# Patient Record
Sex: MALE | Race: WHITE | NOT HISPANIC OR LATINO | URBAN - METROPOLITAN AREA
[De-identification: names, ages, dates, MRNs, and addresses within clinical notes are randomized per-mention and may not be internally consistent; named-entity substitution may affect disease eponyms.]

---

## 2020-01-22 ENCOUNTER — INPATIENT (INPATIENT)
Facility: HOSPITAL | Age: 60
LOS: 2 days | Discharge: HOME | DRG: 247 | End: 2020-01-25
Attending: INTERNAL MEDICINE | Admitting: INTERNAL MEDICINE
Payer: MEDICAID

## 2020-01-22 VITALS
HEIGHT: 69 IN | SYSTOLIC BLOOD PRESSURE: 147 MMHG | WEIGHT: 209 LBS | DIASTOLIC BLOOD PRESSURE: 84 MMHG | RESPIRATION RATE: 18 BRPM | OXYGEN SATURATION: 97 % | HEART RATE: 60 BPM | TEMPERATURE: 98 F

## 2020-01-22 LAB
ALBUMIN SERPL ELPH-MCNC: 4.5 G/DL — SIGNIFICANT CHANGE UP (ref 3.3–5)
ALP SERPL-CCNC: 50 U/L — SIGNIFICANT CHANGE UP (ref 40–120)
ALT FLD-CCNC: 30 U/L — SIGNIFICANT CHANGE UP (ref 10–45)
ANION GAP SERPL CALC-SCNC: 13 MMOL/L — SIGNIFICANT CHANGE UP (ref 5–17)
APTT BLD: 30.3 SEC — SIGNIFICANT CHANGE UP (ref 27.5–36.3)
AST SERPL-CCNC: 26 U/L — SIGNIFICANT CHANGE UP (ref 10–40)
BILIRUB SERPL-MCNC: 0.5 MG/DL — SIGNIFICANT CHANGE UP (ref 0.2–1.2)
BUN SERPL-MCNC: 16 MG/DL — SIGNIFICANT CHANGE UP (ref 7–23)
CALCIUM SERPL-MCNC: 9.7 MG/DL — SIGNIFICANT CHANGE UP (ref 8.4–10.5)
CHLORIDE SERPL-SCNC: 103 MMOL/L — SIGNIFICANT CHANGE UP (ref 96–108)
CO2 SERPL-SCNC: 23 MMOL/L — SIGNIFICANT CHANGE UP (ref 22–31)
CREAT SERPL-MCNC: 0.76 MG/DL — SIGNIFICANT CHANGE UP (ref 0.5–1.3)
GLUCOSE SERPL-MCNC: 102 MG/DL — HIGH (ref 70–99)
HCT VFR BLD CALC: 40.8 % — SIGNIFICANT CHANGE UP (ref 39–50)
HGB BLD-MCNC: 13.8 G/DL — SIGNIFICANT CHANGE UP (ref 13–17)
INR BLD: 1.12 RATIO — SIGNIFICANT CHANGE UP (ref 0.88–1.16)
MAGNESIUM SERPL-MCNC: 2.1 MG/DL — SIGNIFICANT CHANGE UP (ref 1.6–2.6)
MCHC RBC-ENTMCNC: 30.4 PG — SIGNIFICANT CHANGE UP (ref 27–34)
MCHC RBC-ENTMCNC: 33.8 GM/DL — SIGNIFICANT CHANGE UP (ref 32–36)
MCV RBC AUTO: 89.9 FL — SIGNIFICANT CHANGE UP (ref 80–100)
NRBC # BLD: 0 /100 WBCS — SIGNIFICANT CHANGE UP (ref 0–0)
PLATELET # BLD AUTO: 204 K/UL — SIGNIFICANT CHANGE UP (ref 150–400)
POTASSIUM SERPL-MCNC: 4 MMOL/L — SIGNIFICANT CHANGE UP (ref 3.5–5.3)
POTASSIUM SERPL-SCNC: 4 MMOL/L — SIGNIFICANT CHANGE UP (ref 3.5–5.3)
PROT SERPL-MCNC: 7.2 G/DL — SIGNIFICANT CHANGE UP (ref 6–8.3)
PROTHROM AB SERPL-ACNC: 12.9 SEC — SIGNIFICANT CHANGE UP (ref 10–12.9)
RBC # BLD: 4.54 M/UL — SIGNIFICANT CHANGE UP (ref 4.2–5.8)
RBC # FLD: 12.8 % — SIGNIFICANT CHANGE UP (ref 10.3–14.5)
SODIUM SERPL-SCNC: 139 MMOL/L — SIGNIFICANT CHANGE UP (ref 135–145)
TROPONIN T, HIGH SENSITIVITY RESULT: 15 NG/L — SIGNIFICANT CHANGE UP (ref 0–51)
TROPONIN T, HIGH SENSITIVITY RESULT: 15 NG/L — SIGNIFICANT CHANGE UP (ref 0–51)
WBC # BLD: 8.42 K/UL — SIGNIFICANT CHANGE UP (ref 3.8–10.5)
WBC # FLD AUTO: 8.42 K/UL — SIGNIFICANT CHANGE UP (ref 3.8–10.5)

## 2020-01-22 PROCEDURE — 71046 X-RAY EXAM CHEST 2 VIEWS: CPT | Mod: 26

## 2020-01-22 PROCEDURE — 99220: CPT

## 2020-01-22 PROCEDURE — 93010 ELECTROCARDIOGRAM REPORT: CPT

## 2020-01-22 RX ORDER — ASPIRIN/CALCIUM CARB/MAGNESIUM 324 MG
162 TABLET ORAL ONCE
Refills: 0 | Status: DISCONTINUED | OUTPATIENT
Start: 2020-01-22 | End: 2020-01-22

## 2020-01-22 RX ORDER — LISINOPRIL 2.5 MG/1
20 TABLET ORAL DAILY
Refills: 0 | Status: DISCONTINUED | OUTPATIENT
Start: 2020-01-22 | End: 2020-01-25

## 2020-01-22 RX ORDER — ASPIRIN/CALCIUM CARB/MAGNESIUM 324 MG
81 TABLET ORAL DAILY
Refills: 0 | Status: DISCONTINUED | OUTPATIENT
Start: 2020-01-22 | End: 2020-01-25

## 2020-01-22 RX ORDER — ASPIRIN/CALCIUM CARB/MAGNESIUM 324 MG
243 TABLET ORAL ONCE
Refills: 0 | Status: COMPLETED | OUTPATIENT
Start: 2020-01-22 | End: 2020-01-22

## 2020-01-22 RX ADMIN — Medication 243 MILLIGRAM(S): at 19:42

## 2020-01-22 NOTE — ED ADULT NURSE NOTE - CHPI ED NUR SYMPTOMS NEG
no chills/no shortness of breath/no vomiting/no nausea/no dizziness/no syncope/no fever/no diaphoresis/no congestion

## 2020-01-22 NOTE — ED PROVIDER NOTE - PHYSICAL EXAMINATION
Gen: AAOx3, NAD   Head: NCAT  ENT: Airway patent, moist mucous membranes, nasal passageways clear, no JVD   Cardiac: Normal rate, normal rhythm, no murmurs/rubs/gallops appreciated  Respiratory: Lungs CTA B/L  Gastrointestinal:  Abdomen soft, nontender, nondistended, no rebound, no guarding   MSK: No gross abnormalities, FROM of all four extremities, no edema  HEME: Extremities warm, pulses intact and symmetrical in all four extremities  Skin: No rashes, no lesions  Neuro: No gross neurologic deficits

## 2020-01-22 NOTE — ED ADULT NURSE REASSESSMENT NOTE - NS ED NURSE REASSESS COMMENT FT1
Pt received from MARLON Moon. Pt oriented to CDU & plan of care was discussed. Pt A&O x 3. Pt in CDU for CTC in am and cardiac monitoring. Pt denies any chest pain, SOB, dizziness or palpitations as of now. Pt on a cardiac monitor in NSR, HR in 60's. Pt resting in bed. Safety & comfort measures maintained. Call bell in reach. Will continue to monitor.

## 2020-01-22 NOTE — ED ADULT NURSE NOTE - OBJECTIVE STATEMENT
Pt is a 59 Y A&O x3 M with hx of HTN on lisinopril presenting to ED with c/o intermittent "uncomfortable" L sided chest pain radiating to L upper back x1 week. Pt denies SOB, trouble breathing, dizziness, N/V, fevers. Pt arrives to ED breathing unlabored on RA, speaking in complete sentences. Skin is warm and dry. No diaphoresis noted. No edema to LE b/l noted. Cardiac monitor in place. 20 G IV established to L AC. Safety and comfort maintained. Awaiting MD evaluation.

## 2020-01-22 NOTE — ED PROVIDER NOTE - CLINICAL SUMMARY MEDICAL DECISION MAKING FREE TEXT BOX
59M hx HTN without hx of cardiac eval, p/w recurrent chest pressure like pain, ekg sinus without ischemic changes, will cdu pt if labs unremarkable to eval for possible acs

## 2020-01-22 NOTE — ED CDU PROVIDER INITIAL DAY NOTE - OBJECTIVE STATEMENT
58y/o M with PMH of HTN  presenting w/ CP x 1 -1.5 weeks. CP localized to left chest, mild, pressure like sensation, radiates to the left posterior shoulder, intermittent, lasting 30min when it occurs, self resolves, occurs with exertion only. denies associated nausea, diaphoresis, sob, lightheadedness. No positional association. Never had a cardiology evaluation, famhx + father had cardiac issues in the 70s. No recent travel, LE edema or recent illness. No rash. denies fever, flu like symptoms, V/D, abd pain, problems walking, problems going to the bathroom.   In ED, labs unremarkable, CXR negative, delta trops negative. pt sent to CDU for further cardiac work up and monitoring.     PMD Dr. Guillaume in [place], not aff.   no cardio

## 2020-01-22 NOTE — ED PROVIDER NOTE - NS ED ROS FT
Gen: No fever, normal appetite  Eyes: No eye irritation or discharge  ENT: No ear pain, congestion, sore throat  Resp: No cough or trouble breathing  Cardiovascular: + chest pain   Gastroenteric: No nausea/vomiting, diarrhea, constipation  :  No change in urine output; no dysuria  MS: No joint or muscle pain  Skin: No rashes  Neuro: No headache; no abnormal movements  Remainder negative, except as per the HPI

## 2020-01-22 NOTE — ED PROVIDER NOTE - ATTENDING CONTRIBUTION TO CARE
60 yo male p/w substernal intermittent moderate CP x 1-1.5 weeks.  currently pain free on my assessment.  EKG without STEMI.  hx of HTN, no prior cardiac w/u per patient.  will give ASA, check labs, CXR, CDU for serial monitoring and stress vs coronary CT in AM.

## 2020-01-22 NOTE — ED CDU PROVIDER INITIAL DAY NOTE - PSH
No significant past surgical history
Normal rate, regular rhythm.  Heart sounds S1, S2.  No murmurs, rubs or gallops.

## 2020-01-22 NOTE — ED PROVIDER NOTE - OBJECTIVE STATEMENT
59M hx HTN on lisinopril 20mg, nonsmoker, presenting w/ localized mild left chest pressure like sensation with pressure like sensation to the left upper back, intermittent, lasting 30min when it occurs, self resolves, non exertional without associated nausea, diaphoresis, sob. No positional association. Never had a cardiology evaluation, famhx + father had cardiac issues in the 70s. No recent travel, LE  edema or recent illness. No rash. symptoms ongoing x 1-1.5 wks

## 2020-01-22 NOTE — ED PROVIDER NOTE - INTERPRETATION
EKG reviewed for rate, rhythm, axis, intervals and segments, including QRS morphology, P wave appearance T wave appearance, SC interval, and QT interval.  I find the EKG to be unremarkable in all of these regards except as follows: 1st deg AVB

## 2020-01-23 DIAGNOSIS — R07.9 CHEST PAIN, UNSPECIFIED: ICD-10-CM

## 2020-01-23 DIAGNOSIS — Z29.9 ENCOUNTER FOR PROPHYLACTIC MEASURES, UNSPECIFIED: ICD-10-CM

## 2020-01-23 DIAGNOSIS — I10 ESSENTIAL (PRIMARY) HYPERTENSION: ICD-10-CM

## 2020-01-23 PROCEDURE — 75574 CT ANGIO HRT W/3D IMAGE: CPT | Mod: 26

## 2020-01-23 PROCEDURE — 99222 1ST HOSP IP/OBS MODERATE 55: CPT | Mod: AI

## 2020-01-23 PROCEDURE — 99217: CPT

## 2020-01-23 RX ORDER — INFLUENZA VIRUS VACCINE 15; 15; 15; 15 UG/.5ML; UG/.5ML; UG/.5ML; UG/.5ML
0.5 SUSPENSION INTRAMUSCULAR ONCE
Refills: 0 | Status: DISCONTINUED | OUTPATIENT
Start: 2020-01-23 | End: 2020-01-25

## 2020-01-23 RX ORDER — HEPARIN SODIUM 5000 [USP'U]/ML
5000 INJECTION INTRAVENOUS; SUBCUTANEOUS EVERY 8 HOURS
Refills: 0 | Status: DISCONTINUED | OUTPATIENT
Start: 2020-01-23 | End: 2020-01-25

## 2020-01-23 RX ORDER — ATORVASTATIN CALCIUM 80 MG/1
80 TABLET, FILM COATED ORAL AT BEDTIME
Refills: 0 | Status: DISCONTINUED | OUTPATIENT
Start: 2020-01-23 | End: 2020-01-23

## 2020-01-23 RX ORDER — METOPROLOL TARTRATE 50 MG
25 TABLET ORAL DAILY
Refills: 0 | Status: DISCONTINUED | OUTPATIENT
Start: 2020-01-23 | End: 2020-01-25

## 2020-01-23 RX ORDER — CLOPIDOGREL BISULFATE 75 MG/1
75 TABLET, FILM COATED ORAL DAILY
Refills: 0 | Status: DISCONTINUED | OUTPATIENT
Start: 2020-01-24 | End: 2020-01-24

## 2020-01-23 RX ORDER — CLOPIDOGREL BISULFATE 75 MG/1
600 TABLET, FILM COATED ORAL ONCE
Refills: 0 | Status: COMPLETED | OUTPATIENT
Start: 2020-01-23 | End: 2020-01-23

## 2020-01-23 RX ORDER — ATORVASTATIN CALCIUM 80 MG/1
80 TABLET, FILM COATED ORAL AT BEDTIME
Refills: 0 | Status: DISCONTINUED | OUTPATIENT
Start: 2020-01-23 | End: 2020-01-25

## 2020-01-23 RX ADMIN — Medication 81 MILLIGRAM(S): at 14:12

## 2020-01-23 RX ADMIN — ATORVASTATIN CALCIUM 80 MILLIGRAM(S): 80 TABLET, FILM COATED ORAL at 21:10

## 2020-01-23 RX ADMIN — HEPARIN SODIUM 5000 UNIT(S): 5000 INJECTION INTRAVENOUS; SUBCUTANEOUS at 21:10

## 2020-01-23 RX ADMIN — CLOPIDOGREL BISULFATE 600 MILLIGRAM(S): 75 TABLET, FILM COATED ORAL at 14:11

## 2020-01-23 RX ADMIN — Medication 25 MILLIGRAM(S): at 14:11

## 2020-01-23 RX ADMIN — LISINOPRIL 20 MILLIGRAM(S): 2.5 TABLET ORAL at 14:12

## 2020-01-23 NOTE — H&P ADULT - NSHPLABSRESULTS_GEN_ALL_CORE
13.8   8.42  )-----------( 204      ( 22 Jan 2020 19:35 )             40.8     01-22    139  |  103  |  16  ----------------------------<  102<H>  4.0   |  23  |  0.76    Ca    9.7      22 Jan 2020 19:35  Mg     2.1     01-22    TPro  7.2  /  Alb  4.5  /  TBili  0.5  /  DBili  x   /  AST  26  /  ALT  30  /  AlkPhos  50  01-22 13.8   8.42  )-----------( 204      ( 22 Jan 2020 19:35 )             40.8     01-22    139  |  103  |  16  ----------------------------<  102<H>  4.0   |  23  |  0.76    Ca    9.7      22 Jan 2020 19:35  Mg     2.1     01-22    TPro  7.2  /  Alb  4.5  /  TBili  0.5  /  DBili  x   /  AST  26  /  ALT  30  /  AlkPhos  50  01-22    CXR: personally reviewed by me: clear lungs    EKG:     CT Coronary: 13.8   8.42  )-----------( 204      ( 22 Jan 2020 19:35 )             40.8     01-22    139  |  103  |  16  ----------------------------<  102<H>  4.0   |  23  |  0.76    Ca    9.7      22 Jan 2020 19:35  Mg     2.1     01-22    TPro  7.2  /  Alb  4.5  /  TBili  0.5  /  DBili  x   /  AST  26  /  ALT  30  /  AlkPhos  50  01-22    CXR: personally reviewed by me: clear lungs    EKG: No acute St or T wave changes    CT Coronary:    IMPRESSION:    The calculated Agatston calcium score is 821.    Severe (approximately 70%) stenosis of the proximal aspect of the first obtuse marginal branch (OM1).     Mild-moderate (approximately 50%) stenosis of the proximal RCA secondary to mixed plaques    Minimal (less than 25%) to mild (25-50%) stenosis of the left anterior descending, mid to distal RCA, left  circumflex arteries.    Far distal part of the LAD is not well evaluated secondary to very low caliber of the vessel.

## 2020-01-23 NOTE — CONSULT NOTE ADULT - SUBJECTIVE AND OBJECTIVE BOX
Edenilson Vee MD  Interventional Cardiology / Endovascular Specialist  [place] Office : [phone] [place] [address] [place] [address]  Tel:   [place] Office : [address] [facility] [place] [address]  Tel: [phone]  Cell : [phone]    HISTORY OF PRESENTING ILLNESS:    58y/o M with PMH of HTN  presenting w/ CP x 1 -1.5 weeks. CP localized to left chest, mild, pressure like sensation, radiates to the left posterior shoulder, intermittent, lasting 30min when it occurs, self resolves, occurs with exertion only. denies associated nausea, diaphoresis, sob, lightheadedness. No positional association. Never had a cardiology evaluation, famhx + father had cardiac issues in the 70s. No recent travel, LE edema or recent illness. No rash. denies fever, flu like symptoms, V/D, abd pain, problems walking, problems going to the bathroom.   In ED, labs unremarkable, CXR negative, delta trops negative. Pt had CCTA which showed sever prox LCX ds and cards consulted   	  MEDICATIONS:  aspirin enteric coated 81 milliGRAM(s) Oral daily  heparin  Injectable 5000 Unit(s) SubCutaneous every 8 hours  lisinopril 20 milliGRAM(s) Oral daily  metoprolol succinate ER 25 milliGRAM(s) Oral daily                  PAST MEDICAL/SURGICAL HISTORY  PAST MEDICAL & SURGICAL HISTORY:  HTN (hypertension)  No significant past surgical history      SOCIAL HISTORY: Substance Use (street drugs): ( x ) never used  (  ) other:    FAMILY HISTORY:  FH: myocardial infarction      REVIEW OF SYSTEMS:  CONSTITUTIONAL: No fever, weight loss, or fatigue  EYES: No eye pain, visual disturbances, or discharge  ENMT:  No difficulty hearing, tinnitus, vertigo; No sinus or throat pain  BREASTS: No pain, masses, or nipple discharge  GASTROINTESTINAL: No abdominal or epigastric pain. No nausea, vomiting, or hematemesis; No diarrhea or constipation. No melena or hematochezia.  GENITOURINARY: No dysuria, frequency, hematuria, or incontinence  NEUROLOGICAL: No headaches, memory loss, loss of strength, numbness, or tremors  ENDOCRINE: No heat or cold intolerance; No hair loss  MUSCULOSKELETAL: No joint pain or swelling; No muscle, back, or extremity pain  PSYCHIATRIC: No depression, anxiety, mood swings, or difficulty sleeping  HEME/LYMPH: No easy bruising, or bleeding gums  All others negative    PHYSICAL EXAM:  T(C): 36.4 (01-23-20 @ 16:29), Max: 36.9 (01-23-20 @ 16:00)  HR: 56 (01-23-20 @ 16:29) (55 - 99)  BP: 131/81 (01-23-20 @ 16:29) (115/72 - 147/84)  RR: 16 (01-23-20 @ 16:29) (16 - 18)  SpO2: 97% (01-23-20 @ 16:29) (96% - 99%)  Wt(kg): --  I&O's Summary    Height (cm): 175.26 (01-22 @ 18:25)  Weight (kg): 94.8 (01-22 @ 18:25)  BMI (kg/m2): 30.9 (01-22 @ 18:25)  BSA (m2): 2.1 (01-22 @ 18:25)    GENERAL: NAD, well-groomed, well-developed  EYES: EOMI, PERRLA, conjunctiva and sclera clear  ENMT: No tonsillar erythema, exudates, or enlargement; Moist mucous membranes  Cardiovascular: Normal S1 S2, No JVD, No murmurs, No edema  Respiratory: Lungs clear to auscultation	  Gastrointestinal:  Soft, Non-tender, + BS	  Extremities: Normal range of motion, No clubbing, cyanosis or edema  LYMPH: No lymphadenopathy noted                                      13.8   8.42  )-----------( 204      ( 22 Jan 2020 19:35 )             40.8     01-22    139  |  103  |  16  ----------------------------<  102<H>  4.0   |  23  |  0.76    Ca    9.7      22 Jan 2020 19:35  Mg     2.1     01-22    TPro  7.2  /  Alb  4.5  /  TBili  0.5  /  DBili  x   /  AST  26  /  ALT  30  /  AlkPhos  50  01-22    proBNP:   Lipid Profile:   HgA1c: Hemoglobin A1C, Whole Blood: 5.8 % (01-22 @ 19:35)    TSH:     Consultant(s) Notes Reviewed:  [x ] YES  [ ] NO    Care Discussed with Consultants/Other Providers [ x] YES  [ ] NO    Imaging Personally Reviewed independently:  [x] YES  [ ] NO    All labs, radiologic studies, vitals, orders and medications list reviewed. Patient is seen and examined at bedside. Case discussed with medical team.

## 2020-01-23 NOTE — H&P ADULT - ASSESSMENT
58 y/o m w/ PMH of HTN p/w chest pain found to have positive CT coronary test result, planned for cardiac cath tomorrow

## 2020-01-23 NOTE — ED CDU PROVIDER SUBSEQUENT DAY NOTE - HISTORY
No interval changes since initial CDU provider note. Pt feels well without complaint. No active CP or L shoulder pain. NAD, VSS. sinus evin on tele. delta trops negative. pt to get CTC in AM. will continue monitoring. Jodi Griffith

## 2020-01-23 NOTE — H&P ADULT - NSHPPHYSICALEXAM_GEN_ALL_CORE
VITAL SIGNS:    Vital Signs Last 24 Hrs  T(C): 36.7 (23 Jan 2020 12:08), Max: 36.7 (22 Jan 2020 23:47)  T(F): 98.1 (23 Jan 2020 12:08), Max: 98.1 (23 Jan 2020 12:08)  HR: 58 (23 Jan 2020 12:08) (56 - 99)  BP: 133/87 (23 Jan 2020 12:08) (115/72 - 147/84)  BP(mean): --  RR: 16 (23 Jan 2020 12:08) (16 - 18)  SpO2: 96% (23 Jan 2020 12:08) (96% - 99%)    PHYSICAL EXAM:     GENERAL: no acute distress  HEENT: NC/AT, EOMI, neck supple, MMM  RESPIRATORY: LCTAB/L, no rhonchi, rales, or wheezing  CARDIOVASCULAR: RRR, no murmurs, gallops, rubs  ABDOMINAL: soft, non-tender, non-distended, positive bowel sounds   EXTREMITIES: no clubbing, cyanosis, or edema  NEUROLOGICAL: alert and oriented x 3, non-focal  SKIN: no rashes or lesions   MUSCULOSKELETAL: no gross joint deformity

## 2020-01-23 NOTE — ED CDU PROVIDER DISPOSITION NOTE - ATTENDING CONTRIBUTION TO CARE
Patient seen and eval'd at bedside.  Stress results reviewed.  Are of ischemia identified; plan to admit pt to tele after d/w cardiology.  Inpatient cath planned.  Patient aware of findings and plan, states cp free currently, no events on tele.  Will c/t monitor in cdu.  --BMM

## 2020-01-23 NOTE — ED CDU PROVIDER DISPOSITION NOTE - CLINICAL COURSE
60y/o M with PMH of HTN  presenting w/ CP x 1 -1.5 weeks. CP localized to left chest, mild, pressure like sensation, radiates to the left posterior shoulder, intermittent, lasting 30min when it occurs, self resolves, occurs with exertion only. denies associated nausea, diaphoresis, sob, lightheadedness. No positional association. Never had a cardiology evaluation, famhx + father had cardiac issues in the 70s. No recent travel, LE edema or recent illness. No rash. denies fever, flu like symptoms, V/D, abd pain, problems walking, problems going to the bathroom.   In ED, labs unremarkable, CXR negative, delta trops negative. pt sent to CDU for further cardiac work up and monitoring.  In CDU, _______________ 60y/o M with PMH of HTN  presenting w/ CP x 1 -1.5 weeks. CP localized to left chest, mild, pressure like sensation, radiates to the left posterior shoulder, intermittent, lasting 30min when it occurs, self resolves, occurs with exertion only. denies associated nausea, diaphoresis, sob, lightheadedness. No positional association. Never had a cardiology evaluation, famhx + father had cardiac issues in the 70s. No recent travel, LE edema or recent illness. No rash. denies fever, flu like symptoms, V/D, abd pain, problems walking, problems going to the bathroom.   In ED, labs unremarkable, CXR negative, delta trops negative. pt sent to CDU for further cardiac work up and monitoring.  In CDU, no events on tele, CTC revealed severe stenosis at OM branch. pt seen by ramona and admitted for cath.

## 2020-01-23 NOTE — ED CDU PROVIDER DISPOSITION NOTE - NS_EDPROVIDERDISPOUSERTYPE_ED_A_ED
----- Message from Sabrina Fritz M.D. sent at 1/22/2018  7:40 AM PST -----  Please, notify the patient that labs are reviewed, and will be discussed at OV;  - advise to continue current meds,   thanks, Dr Wright      
Phone Number Called: [phone] (home)     Message: Patient has upcoming appt.        Left Message for patient to call back: yes        
Attending Attestation (For Attendings USE Only)...

## 2020-01-23 NOTE — ED CDU PROVIDER SUBSEQUENT DAY NOTE - PROGRESS NOTE DETAILS
CDU NOTE SULTANA GARCIA: Pt resting comfortably, feeling well without complaint. NAD, VSS. No events on telemetry. will continue monitoring. CDU NOTE SULTANA Clayton: pt resting comfortably, feels well without complaint. NAD recent VSS. no events on tele. CDU NOTE SULTANA Clayton: CTC- severe disease to OM branch. spoke to Dr. Vee, Cards, will come see patient. CDU NOTE SULTANA Clayton: as per Dr. Vee, will admit pt to Dr. Abad, pt will be set up for Cath. CDU NOTE SULTANA Clayton: as per Dr. Vee- Plavix 600mg po x 1 now, then 75mg daily, NPO after midnight for cath tomorrow, start Toprol XL 25mg daily.

## 2020-01-23 NOTE — ED CDU PROVIDER DISPOSITION NOTE - NSFOLLOWUPINSTRUCTIONS_ED_ALL_ED_FT
1. Recommend Aspirin 81mg over the counter daily until further evaluation.  Take all of your other medications as previously prescribed.   2. Follow up with your PMD and cardiologist within 48-72 hours. Show copies of your reports given to you.   3. Worsening or continued chest pain, shortness of breath, weakness, return to ED.

## 2020-01-23 NOTE — H&P ADULT - PROBLEM SELECTOR PLAN 1
Pt has had 1-2 wks of chest pain, and found to have a positive CT coronary study, now planned for cardiac cath tomorrow; NPO after MN as per Dr. Monreal  - pt started on asa, plavix loaded, and toprol added  - LDL is < 100, Hba1c at 5.8  - will cont to f/u cards recs

## 2020-01-23 NOTE — H&P ADULT - NSHPREVIEWOFSYSTEMS_GEN_ALL_CORE
CARDIOVASCULAR: + Chest pain    CONSTITUTIONAL: No weakness, fevers or chills  EYES/ENT: No visual changes, no throat pain   RESPIRATORY: No cough, wheezing, hemoptysis; No shortness of breath  GASTROINTESTINAL: No abdominal, nausea, vomiting, or hematemesis; No diarrhea or constipation. No melena or hematochezia.  GENITOURINARY: No dysuria, frequency or hematuria  NEUROLOGICAL: No dizziness, numbness, or weakness  SKIN: No itching, burning, rashes, or lesions   ALLERGY: No seasonal allergies, no rhinorrhea  MUSCULOSKELETAL: No arthralgias, no myalgias

## 2020-01-23 NOTE — H&P ADULT - HISTORY OF PRESENT ILLNESS
60 y/o m w/ PMH of HTN p/w 1-2 weeks of intermittent chest pains, located in mid left chest, lasting for up to 30 minutes, pressure-like in quality, not associated with movement or food, resolve on their own.  He did not have any sob, diaphoresis, or nausea.  Pt was evaluated by his cardiologist, who ordered a CT Coronary study which revealed multiple stenoses in coronary arteries.  Pt was admitted for a cardiac catheterization.    In the ED, he was given aspirin and loaded with plavix.

## 2020-01-24 LAB
ANION GAP SERPL CALC-SCNC: 11 MMOL/L — SIGNIFICANT CHANGE UP (ref 5–17)
BASOPHILS # BLD AUTO: 0.05 K/UL — SIGNIFICANT CHANGE UP (ref 0–0.2)
BASOPHILS NFR BLD AUTO: 0.6 % — SIGNIFICANT CHANGE UP (ref 0–2)
BUN SERPL-MCNC: 20 MG/DL — SIGNIFICANT CHANGE UP (ref 7–23)
CALCIUM SERPL-MCNC: 9.8 MG/DL — SIGNIFICANT CHANGE UP (ref 8.4–10.5)
CHLORIDE SERPL-SCNC: 100 MMOL/L — SIGNIFICANT CHANGE UP (ref 96–108)
CO2 SERPL-SCNC: 23 MMOL/L — SIGNIFICANT CHANGE UP (ref 22–31)
CREAT SERPL-MCNC: 0.86 MG/DL — SIGNIFICANT CHANGE UP (ref 0.5–1.3)
EOSINOPHIL # BLD AUTO: 0.33 K/UL — SIGNIFICANT CHANGE UP (ref 0–0.5)
EOSINOPHIL NFR BLD AUTO: 3.8 % — SIGNIFICANT CHANGE UP (ref 0–6)
GLUCOSE SERPL-MCNC: 106 MG/DL — HIGH (ref 70–99)
HCT VFR BLD CALC: 45 % — SIGNIFICANT CHANGE UP (ref 39–50)
HCV AB S/CO SERPL IA: 0.21 S/CO — SIGNIFICANT CHANGE UP (ref 0–0.99)
HCV AB SERPL-IMP: SIGNIFICANT CHANGE UP
HGB BLD-MCNC: 15.1 G/DL — SIGNIFICANT CHANGE UP (ref 13–17)
IMM GRANULOCYTES NFR BLD AUTO: 0.3 % — SIGNIFICANT CHANGE UP (ref 0–1.5)
LYMPHOCYTES # BLD AUTO: 2.61 K/UL — SIGNIFICANT CHANGE UP (ref 1–3.3)
LYMPHOCYTES # BLD AUTO: 30.1 % — SIGNIFICANT CHANGE UP (ref 13–44)
MAGNESIUM SERPL-MCNC: 2.4 MG/DL — SIGNIFICANT CHANGE UP (ref 1.6–2.6)
MCHC RBC-ENTMCNC: 30.1 PG — SIGNIFICANT CHANGE UP (ref 27–34)
MCHC RBC-ENTMCNC: 33.6 GM/DL — SIGNIFICANT CHANGE UP (ref 32–36)
MCV RBC AUTO: 89.6 FL — SIGNIFICANT CHANGE UP (ref 80–100)
MONOCYTES # BLD AUTO: 0.85 K/UL — SIGNIFICANT CHANGE UP (ref 0–0.9)
MONOCYTES NFR BLD AUTO: 9.8 % — SIGNIFICANT CHANGE UP (ref 2–14)
NEUTROPHILS # BLD AUTO: 4.8 K/UL — SIGNIFICANT CHANGE UP (ref 1.8–7.4)
NEUTROPHILS NFR BLD AUTO: 55.4 % — SIGNIFICANT CHANGE UP (ref 43–77)
PHOSPHATE SERPL-MCNC: 4 MG/DL — SIGNIFICANT CHANGE UP (ref 2.5–4.5)
PLATELET # BLD AUTO: 208 K/UL — SIGNIFICANT CHANGE UP (ref 150–400)
POTASSIUM SERPL-MCNC: 4.1 MMOL/L — SIGNIFICANT CHANGE UP (ref 3.5–5.3)
POTASSIUM SERPL-SCNC: 4.1 MMOL/L — SIGNIFICANT CHANGE UP (ref 3.5–5.3)
RBC # BLD: 5.02 M/UL — SIGNIFICANT CHANGE UP (ref 4.2–5.8)
RBC # FLD: 12.7 % — SIGNIFICANT CHANGE UP (ref 10.3–14.5)
SODIUM SERPL-SCNC: 134 MMOL/L — LOW (ref 135–145)
WBC # BLD: 8.67 K/UL — SIGNIFICANT CHANGE UP (ref 3.8–10.5)
WBC # FLD AUTO: 8.67 K/UL — SIGNIFICANT CHANGE UP (ref 3.8–10.5)

## 2020-01-24 PROCEDURE — 93010 ELECTROCARDIOGRAM REPORT: CPT

## 2020-01-24 RX ORDER — ASPIRIN/CALCIUM CARB/MAGNESIUM 324 MG
1 TABLET ORAL
Qty: 30 | Refills: 0
Start: 2020-01-24 | End: 2020-02-23

## 2020-01-24 RX ORDER — LISINOPRIL 2.5 MG/1
1 TABLET ORAL
Qty: 0 | Refills: 0 | DISCHARGE

## 2020-01-24 RX ORDER — METOPROLOL TARTRATE 50 MG
1 TABLET ORAL
Qty: 30 | Refills: 0
Start: 2020-01-24 | End: 2020-02-23

## 2020-01-24 RX ORDER — METOPROLOL TARTRATE 50 MG
1 TABLET ORAL
Qty: 30 | Refills: 0
Start: 2020-01-24 | End: 2020-02-22

## 2020-01-24 RX ORDER — ATORVASTATIN CALCIUM 80 MG/1
1 TABLET, FILM COATED ORAL
Qty: 30 | Refills: 0
Start: 2020-01-24 | End: 2020-02-22

## 2020-01-24 RX ORDER — ATORVASTATIN CALCIUM 80 MG/1
1 TABLET, FILM COATED ORAL
Qty: 30 | Refills: 0
Start: 2020-01-24 | End: 2020-02-23

## 2020-01-24 RX ORDER — ASPIRIN/CALCIUM CARB/MAGNESIUM 324 MG
1 TABLET ORAL
Qty: 30 | Refills: 0
Start: 2020-01-24 | End: 2020-02-22

## 2020-01-24 RX ORDER — LISINOPRIL 2.5 MG/1
1 TABLET ORAL
Qty: 0 | Refills: 0 | DISCHARGE
Start: 2020-01-24

## 2020-01-24 RX ORDER — TICAGRELOR 90 MG/1
90 TABLET ORAL EVERY 12 HOURS
Refills: 0 | Status: DISCONTINUED | OUTPATIENT
Start: 2020-01-24 | End: 2020-01-25

## 2020-01-24 RX ORDER — TICAGRELOR 90 MG/1
1 TABLET ORAL
Qty: 60 | Refills: 0
Start: 2020-01-24 | End: 2020-02-22

## 2020-01-24 RX ORDER — TICAGRELOR 90 MG/1
1 TABLET ORAL
Qty: 60 | Refills: 0
Start: 2020-01-24 | End: 2020-02-23

## 2020-01-24 RX ADMIN — HEPARIN SODIUM 5000 UNIT(S): 5000 INJECTION INTRAVENOUS; SUBCUTANEOUS at 21:55

## 2020-01-24 RX ADMIN — Medication 25 MILLIGRAM(S): at 05:17

## 2020-01-24 RX ADMIN — LISINOPRIL 20 MILLIGRAM(S): 2.5 TABLET ORAL at 05:17

## 2020-01-24 RX ADMIN — TICAGRELOR 90 MILLIGRAM(S): 90 TABLET ORAL at 18:32

## 2020-01-24 RX ADMIN — ATORVASTATIN CALCIUM 80 MILLIGRAM(S): 80 TABLET, FILM COATED ORAL at 21:55

## 2020-01-24 RX ADMIN — Medication 81 MILLIGRAM(S): at 18:32

## 2020-01-24 NOTE — PROGRESS NOTE ADULT - SUBJECTIVE AND OBJECTIVE BOX
Subjective: Patient seen and examined. No new events except as noted.   S/P Cath today   doing well   DAPT and statin  D.C planing     REVIEW OF SYSTEMS:    CONSTITUTIONAL: No weakness, fevers or chills  EYES/ENT: No visual changes;  No vertigo or throat pain   NECK: No pain or stiffness  RESPIRATORY: No cough, wheezing, hemoptysis; No shortness of breath  CARDIOVASCULAR: No chest pain or palpitations  GASTROINTESTINAL: No abdominal or epigastric pain. No nausea, vomiting, or hematemesis; No diarrhea or constipation. No melena or hematochezia.  GENITOURINARY: No dysuria, frequency or hematuria  NEUROLOGICAL: No numbness or weakness  SKIN: No itching, burning, rashes, or lesions   All other review of systems is negative unless indicated above.    MEDICATIONS:  MEDICATIONS  (STANDING):  aspirin enteric coated 81 milliGRAM(s) Oral daily  atorvastatin 80 milliGRAM(s) Oral at bedtime  heparin  Injectable 5000 Unit(s) SubCutaneous every 8 hours  influenza   Vaccine 0.5 milliLiter(s) IntraMuscular once  lisinopril 20 milliGRAM(s) Oral daily  metoprolol succinate ER 25 milliGRAM(s) Oral daily  ticagrelor 90 milliGRAM(s) Oral every 12 hours      PHYSICAL EXAM:  T(C): 36.5 (01-24-20 @ 14:10), Max: 36.7 (01-23-20 @ 20:33)  HR: 62 (01-24-20 @ 14:10) (56 - 63)  BP: 138/87 (01-24-20 @ 14:10) (125/82 - 145/77)  RR: 18 (01-24-20 @ 14:10) (16 - 18)  SpO2: 97% (01-24-20 @ 14:10) (96% - 98%)  Wt(kg): --  I&O's Summary    23 Jan 2020 07:01  -  24 Jan 2020 07:00  --------------------------------------------------------  IN: 200 mL / OUT: 200 mL / NET: 0 mL          Appearance: Normal	  HEENT:   Normal oral mucosa, PERRL, EOMI	  Lymphatic: No lymphadenopathy , no edema  Cardiovascular: Normal S1 S2, No JVD, No murmurs , Peripheral pulses palpable 2+ bilaterally  Respiratory: Lungs clear to auscultation, normal effort 	  Gastrointestinal:  Soft, Non-tender, + BS	  Skin: No rashes, No ecchymoses, No cyanosis, warm to touch  Musculoskeletal: Normal range of motion, normal strength  Psychiatry:  Mood & affect appropriate  Ext: No edema      All labs, Imaging and EKGs personally reviewed                           15.1   8.67  )-----------( 208      ( 24 Jan 2020 08:25 )             45.0               01-24    134<L>  |  100  |  20  ----------------------------<  106<H>  4.1   |  23  |  0.86    Ca    9.8      24 Jan 2020 05:34  Phos  4.0     01-24  Mg     2.4     01-24    TPro  7.2  /  Alb  4.5  /  TBili  0.5  /  DBili  x   /  AST  26  /  ALT  30  /  AlkPhos  50  01-22    PT/INR - ( 22 Jan 2020 19:35 )   PT: 12.9 sec;   INR: 1.12 ratio         PTT - ( 22 Jan 2020 19:35 )  PTT:30.3 sec                   < from: Cardiac Cath Lab - Adult (01.24.20 @ 08:50) >  CORONARY VESSELS: The coronary circulation is right dominant.  LM:   --  LM: Normal.  LAD:   --  LAD: Angiography showed mild atherosclerosis with no flow  limiting lesions.  --  D1: There was a 80 % stenosis. ostial disease will treat medically for  now  CX:   --  OM1: There was a 80 % stenosis.  RCA:   --  RCA: Angiography showed mild atherosclerosis with no flow  limiting lesions.  COMPLICATIONS: There were no complications.  INTERVENTIONAL RECOMMENDATIONS: cont asa and brilinta for 1 year  Prepared and signed by

## 2020-01-24 NOTE — DISCHARGE NOTE PROVIDER - NSDCFUADDINST_GEN_ALL_CORE_FT
Cardiac catheterization or coronary angiogram is done to understand how the heart is working and to look at the coronary arteries which supply oxygen to the heart muscles, to look at the heart chambers and the valves in the heart.  The doctor uses your groin or your arm to do the procedure  You will need to see your doctor within one week after discharge  Call your doctor if the insertion site bleeds a lot, if you get a fever or have pain, swelling, or redness where the tube went in, or if your leg feels weak, numb, or cold.    No heavy lifting, strenuous activity, bending, straining or unnecessary stair climbing  for 2 weeks. No sex for 1 week, and no driving for 2 days after cath is recommeded. You may shower 24 hours following procedure but avoid baths and swimming for 1 week. Check the site for bleeding and/or swelling daily following procedure. Call your doctor/cardiologist immediately should it occur or if you have increased/persistent pain at the site. Follow up with your cardiologist in 1- 2 weeks. You may call [facility] at [phone] or [phone] after office hours and weekends  with any questions or concerns following your procedure. Take medications as prescribed.

## 2020-01-24 NOTE — PHARMACOTHERAPY INTERVENTION NOTE - COMMENTS
Patient s/p stent, on Brilinta 90 mg BID. Notified by [facility] pharmacy that medication requires prior authorization (phone number [phone]). Discussed with prescribing NP SULTANA Feliciano filed as urgent, result anticipated within 24 hours (reference number [id number]). Discussed with cardiology Dr. Vee, patient can get a free one month supply in the meantime per  coupon and will follow up with Dr. Vee outpatient to confirm coverage/transition to clopidogrel if necessary.    Quin Boykin, PharmD   [phone]

## 2020-01-24 NOTE — DISCHARGE NOTE PROVIDER - CARE PROVIDER_API CALL
Edenilson Vee (MD)  Cardiovascular Disease; Nuclear Cardiology  [address] [place] [address]  Phone: [phone]  Fax: [phone]  Follow Up Time:

## 2020-01-24 NOTE — PHARMACOTHERAPY INTERVENTION NOTE - COMMENTS
Counseled patient on anticipated discharge medication doses, indications, and possible side effects. Provided and reviewed medication cards for new medications aspirin, Brilinta, atorvastatin, and metoprolol. Patient's son already picked up one month free trial of Brilinta, patient aware of importance of checking medication coverage at next cardiology appointment.    Quin Boykin, PharmD   [phone]

## 2020-01-24 NOTE — PROGRESS NOTE ADULT - ASSESSMENT
EKG SR no ischemic     CTA: < from: CT Heart with Coronaries (01.23.20 @ 09:11) >  The calculated Agatston calcium score is 821.    Severe (approximately 70%) stenosis of the proximal aspect of the first obtuse marginal branch (OM1).     Mild-moderate (approximately 50%) stenosis of the proximal RCA secondary to mixed plaques    Minimal (less than 25%) to mild (25-50%) stenosis of the left anterior descending, mid to distal RCA, left  circumflex arteries.    Far distal part of the LAD is not well evaluated secondary to very low caliber of the vessel.    < end of copied text >    Assessment and Plan     1) Chest pain: CTA abnormal , cath shows LM normal LAD mild luminal D1 80% ostial medically managed , OM1 80% s/p PCI , RCA luminal cont asa brilinta    2) HTN c/w lisinopril and lopressor    3) DVT PPX heparin

## 2020-01-24 NOTE — DISCHARGE NOTE PROVIDER - NSDCFUADDAPPT_GEN_ALL_CORE_FT
Follow up with PMD within 1 week of discharge.   Follow up with Cardiologist within 1 week of discharge. Please follow up with PMD within 1 week of discharge.   Please follow up with Cardiologist, Dr. Vee within 1 week of discharge for the management. Dr. Vee will follow up with your medication prior authorization status as well. Please continue to take meds as directed Please follow up with PMD within 1 week of discharge.   Please follow up with Cardiologist, Dr. Vee within 1 week of discharge for the management. Dr. Vee will follow up with your medication (Brilinta) prior authorization status as well. Please continue to take meds as directed

## 2020-01-24 NOTE — PROGRESS NOTE ADULT - ASSESSMENT
60 y/o m w/ PMH of HTN p/w chest pain found to have positive CT coronary test result, planned for cardiac cath tomorrow

## 2020-01-24 NOTE — PROGRESS NOTE ADULT - PROBLEM SELECTOR PLAN 1
Pt has had 1-2 wks of chest pain, and found to have a positive CT coronary study, S?P Cath, PCIx1   DAPT and statin   Card F/U  D/ C Planing tomorrow AM

## 2020-01-24 NOTE — DISCHARGE NOTE PROVIDER - NSDCCPCAREPLAN_GEN_ALL_CORE_FT
PRINCIPAL DISCHARGE DIAGNOSIS  Diagnosis: Chest pain with moderate risk for cardiac etiology  Assessment and Plan of Treatment: HOME CARE INSTRUCTIONS  For the next few days, avoid physical activities that bring on chest pain. Continue physical activities as directed.  Do not smoke.  Avoid drinking alcohol.   Only take over-the-counter or prescription medicine for pain, discomfort, or fever as directed by your caregiver.  Follow your caregiver's suggestions for further testing if your chest pain does not go away.  Keep any follow-up appointments you made. If you do not go to an appointment, you could develop lasting (chronic) problems with pain. If there is any problem keeping an appointment, you must call to reschedule.   SEEK MEDICAL CARE IF:  You think you are having problems from the medicine you are taking. Read your medicine instructions carefully.  Your chest pain does not go away, even after treatment.  You develop a rash with blisters on your chest.  SEEK IMMEDIATE MEDICAL CARE IF:  You have increased chest pain or pain that spreads to your arm, neck, jaw, back, or abdomen.   You develop shortness of breath, an increasing cough, or you are coughing up blood.  You have severe back or abdominal pain, feel nauseous, or vomit.  You develop severe weakness, fainting, or chills.  You have a fever.  THIS IS AN EMERGENCY. Do not wait to see if the pain will go away. Get medical help at once. Call your local emergency services . Do not drive yourself to the hospital.        SECONDARY DISCHARGE DIAGNOSES  Diagnosis: HTN (hypertension)  Assessment and Plan of Treatment: Low salt diet  Activity as tolerated.  Take all medication as prescribed.  Follow up with your medical doctor for routine blood pressure monitoring at your next visit.  Notify your doctor if you have any of the following symptoms:   Dizziness, Lightheadedness, Blurry vision, Headache, Chest pain, Shortness of breath PRINCIPAL DISCHARGE DIAGNOSIS  Diagnosis: Chest pain with moderate risk for cardiac etiology  Assessment and Plan of Treatment: HOME CARE INSTRUCTIONS  For the next few days, avoid physical activities that bring on chest pain. Continue physical activities as directed.  Do not smoke.  Avoid drinking alcohol.   Only take over-the-counter or prescription medicine for pain, discomfort, or fever as directed by your caregiver.  Follow your caregiver's suggestions for further testing if your chest pain does not go away.  Keep any follow-up appointments you made. If you do not go to an appointment, you could develop lasting (chronic) problems with pain. If there is any problem keeping an appointment, you must call to reschedule.   SEEK MEDICAL CARE IF:  You think you are having problems from the medicine you are taking. Read your medicine instructions carefully.  Your chest pain does not go away, even after treatment.  You develop a rash with blisters on your chest.  SEEK IMMEDIATE MEDICAL CARE IF:  You have increased chest pain or pain that spreads to your arm, neck, jaw, back, or abdomen.   You develop shortness of breath, an increasing cough, or you are coughing up blood.  You have severe back or abdominal pain, feel nauseous, or vomit.  You develop severe weakness, fainting, or chills.  You have a fever.  THIS IS AN EMERGENCY. Do not wait to see if the pain will go away. Get medical help at once. Call your local emergency services . Do not drive yourself to the hospital.        SECONDARY DISCHARGE DIAGNOSES  Diagnosis: HTN (hypertension)  Assessment and Plan of Treatment: Hypertension, also known simply as "high blood pressure" is very common, however can lead to many significant complications if left uncontrolled. When the blood pressure is elevated, the force the blood puts on the walls of the arteries is high and can lead to artery damage. Also, when the heart muscle has to pump blood against a high blood pressure, it thickens and enlarges, just like any muscle does when it has to do more work (think of a weight ). When the blood pressure is very high, people may feel a headache or tired. Some people can feel pounding in their head or have blurry vision. Hearing the heart beating in the ear especially at night can be a sign of high blood pressure. Eventually, symptoms of stroke, heart attack, heart failure or irregular heartbeats can occur  - Exercise: Doing cardiovascular exercise such as running, biking or swimming at least 30 minutes per day most days of the week is recommended to help keep blood pressure healthy  - Lose weight: Maintaining a normal BMI (body mass index) is very important in keeping blood pressure readings normal   - Avoid salt: Sodium in the diet increases the blood pressure in many ways. Salt comes in many foods, so just because you don't add salt to your food it does not mean that you are eating a low salt diet. Read labels and keep sodium intake to less than 2000 mg per day   - Avoid alcohol: Even 1 or 2 alcoholic drinks can significantly increase blood pressure   - DASH Diet: The DASH diet has been shown to reduce blood pressure   - Take all medication as prescribed.   - Follow up with your medical doctor for routine blood pressure monitoring at your next visit.   - Notify your doctor if you have any of the following symptoms:    - Dizziness, Lightheadedness, Blurry vision, Headache, Chest pain, Shortness of breath PRINCIPAL DISCHARGE DIAGNOSIS  Diagnosis: Chest pain with moderate risk for cardiac etiology  Assessment and Plan of Treatment: HOME CARE INSTRUCTIONS  For the next few days, avoid physical activities that bring on chest pain. Continue physical activities as directed.  Do not Informed pt of the various negative side effects of smoking including risk of COPD, Lung Ca etc  Strongly recommended that pt stops smoking and pt given various options of smoking cessasion tools such as NRT's and other pharmacotherapies.  Avoid drinking alcohol.   Only take over-the-counter or prescription medicine for pain, discomfort, or fever as directed by your caregiver.  Follow your caregiver's suggestions for further testing if your chest pain does not go away.  Keep any follow-up appointments you made. If you do not go to an appointment, you could develop lasting (chronic) problems with pain. If there is any problem keeping an appointment, you must call to reschedule.   SEEK MEDICAL CARE IF:  You think you are having problems from the medicine you are taking. Read your medicine instructions carefully.  Your chest pain does not go away, even after treatment.  You develop a rash with blisters on your chest.  SEEK IMMEDIATE MEDICAL CARE IF:  You have increased chest pain or pain that spreads to your arm, neck, jaw, back, or abdomen.   You develop shortness of breath, an increasing cough, or you are coughing up blood.  You have severe back or abdominal pain, feel nauseous, or vomit.  You develop severe weakness, fainting, or chills.  You have a fever.  THIS IS AN EMERGENCY. Do not wait to see if the pain will go away. Get medical help at once. Call your local emergency services . Do not drive yourself to the hospital.        SECONDARY DISCHARGE DIAGNOSES  Diagnosis: HTN (hypertension)  Assessment and Plan of Treatment: Hypertension, also known simply as "high blood pressure" is very common, however can lead to many significant complications if left uncontrolled. When the blood pressure is elevated, the force the blood puts on the walls of the arteries is high and can lead to artery damage. Also, when the heart muscle has to pump blood against a high blood pressure, it thickens and enlarges, just like any muscle does when it has to do more work (think of a weight ). When the blood pressure is very high, people may feel a headache or tired. Some people can feel pounding in their head or have blurry vision. Hearing the heart beating in the ear especially at night can be a sign of high blood pressure. Eventually, symptoms of stroke, heart attack, heart failure or irregular heartbeats can occur  - Exercise: Doing cardiovascular exercise such as running, biking or swimming at least 30 minutes per day most days of the week is recommended to help keep blood pressure healthy  - Lose weight: Maintaining a normal BMI (body mass index) is very important in keeping blood pressure readings normal   - Avoid salt: Sodium in the diet increases the blood pressure in many ways. Salt comes in many foods, so just because you don't add salt to your food it does not mean that you are eating a low salt diet. Read labels and keep sodium intake to less than 2000 mg per day   - Avoid alcohol: Even 1 or 2 alcoholic drinks can significantly increase blood pressure   - DASH Diet: The DASH diet has been shown to reduce blood pressure   - Take all medication as prescribed.   - Follow up with your medical doctor for routine blood pressure monitoring at your next visit.   - Notify your doctor if you have any of the following symptoms:    - Dizziness, Lightheadedness, Blurry vision, Headache, Chest pain, Shortness of breath PRINCIPAL DISCHARGE DIAGNOSIS  Diagnosis: Chest pain with moderate risk for cardiac etiology  Assessment and Plan of Treatment: HOME CARE INSTRUCTIONS  For the next few days, avoid physical activities that bring on chest pain. Continue physical activities as directed.  Avoid drinking alcohol.   Only take over-the-counter or prescription medicine for pain, discomfort, or fever as directed by your caregiver.  Follow your caregiver's suggestions for further testing if your chest pain does not go away.  Keep any follow-up appointments you made. If you do not go to an appointment, you could develop lasting (chronic) problems with pain. If there is any problem keeping an appointment, you must call to reschedule.   SEEK MEDICAL CARE IF:  You think you are having problems from the medicine you are taking. Read your medicine instructions carefully.  Your chest pain does not go away, even after treatment.  You develop a rash with blisters on your chest.  SEEK IMMEDIATE MEDICAL CARE IF:  You have increased chest pain or pain that spreads to your arm, neck, jaw, back, or abdomen.   You develop shortness of breath, an increasing cough, or you are coughing up blood.  You have severe back or abdominal pain, feel nauseous, or vomit.  You develop severe weakness, fainting, or chills.  You have a fever.  THIS IS AN EMERGENCY. Do not wait to see if the pain will go away. Get medical help at once. Call your local emergency services . Do not drive yourself to the hospital.        SECONDARY DISCHARGE DIAGNOSES  Diagnosis: HTN (hypertension)  Assessment and Plan of Treatment: Hypertension, also known simply as "high blood pressure" is very common, however can lead to many significant complications if left uncontrolled. When the blood pressure is elevated, the force the blood puts on the walls of the arteries is high and can lead to artery damage. Also, when the heart muscle has to pump blood against a high blood pressure, it thickens and enlarges, just like any muscle does when it has to do more work (think of a weight ). When the blood pressure is very high, people may feel a headache or tired. Some people can feel pounding in their head or have blurry vision. Hearing the heart beating in the ear especially at night can be a sign of high blood pressure. Eventually, symptoms of stroke, heart attack, heart failure or irregular heartbeats can occur  - Exercise: Doing cardiovascular exercise such as running, biking or swimming at least 30 minutes per day most days of the week is recommended to help keep blood pressure healthy  - Lose weight: Maintaining a normal BMI (body mass index) is very important in keeping blood pressure readings normal   - Avoid salt: Sodium in the diet increases the blood pressure in many ways. Salt comes in many foods, so just because you don't add salt to your food it does not mean that you are eating a low salt diet. Read labels and keep sodium intake to less than 2000 mg per day   - Avoid alcohol: Even 1 or 2 alcoholic drinks can significantly increase blood pressure   - DASH Diet: The DASH diet has been shown to reduce blood pressure   - Take all medication as prescribed.   - Follow up with your medical doctor for routine blood pressure monitoring at your next visit.   - Notify your doctor if you have any of the following symptoms:    - Dizziness, Lightheadedness, Blurry vision, Headache, Chest pain, Shortness of breath

## 2020-01-24 NOTE — PROGRESS NOTE ADULT - SUBJECTIVE AND OBJECTIVE BOX
Edenilson Vee MD  Interventional Cardiology / Advance Heart Failure and Cardiac Transplant Specialist  [place] Office : [address] [place] [address] [address]. [address]  Tel:   [place] Office : [address] [facility] [place] [address]  Tel: [phone]  Cell : [phone]    Pt is lying in bed comfortable not in distress, no chest pains no SOB no palpitations s/p PCI of OM1   	  MEDICATIONS:  aspirin enteric coated 81 milliGRAM(s) Oral daily  heparin  Injectable 5000 Unit(s) SubCutaneous every 8 hours  lisinopril 20 milliGRAM(s) Oral daily  metoprolol succinate ER 25 milliGRAM(s) Oral daily  ticagrelor 90 milliGRAM(s) Oral every 12 hours  atorvastatin 80 milliGRAM(s) Oral at bedtime  influenza   Vaccine 0.5 milliLiter(s) IntraMuscular once    PAST MEDICAL/SURGICAL HISTORY  PAST MEDICAL & SURGICAL HISTORY:  HTN (hypertension)  No significant past surgical history      SOCIAL HISTORY: Substance Use (street drugs): ( x ) never used  (  ) other:    FAMILY HISTORY:  FH: myocardial infarction        PHYSICAL EXAM:  T(C): 36.6 (01-24-20 @ 04:45), Max: 36.9 (01-23-20 @ 16:00)  HR: 60 (01-24-20 @ 04:45) (55 - 65)  BP: 145/77 (01-24-20 @ 04:45) (122/75 - 145/77)  RR: 16 (01-24-20 @ 04:45) (16 - 18)  SpO2: 98% (01-24-20 @ 04:45) (96% - 98%)  Wt(kg): --  I&O's Summary    23 Jan 2020 07:01  -  24 Jan 2020 07:00  --------------------------------------------------------  IN: 200 mL / OUT: 200 mL / NET: 0 mL          EYES: EOMI, PERRLA, conjunctiva and sclera clear  ENMT: No tonsillar erythema, exudates, or enlargement; Moist mucous membranes, Good dentition, No lesions  Cardiovascular: Normal S1 S2, No JVD, No murmurs, No edema  Respiratory: Lungs clear to auscultation	  Gastrointestinal:  Soft, Non-tender, + BS	  Extremities: Normal range of motion, No clubbing, cyanosis or edema                                      15.1   8.67  )-----------( 208      ( 24 Jan 2020 08:25 )             45.0     01-24    134<L>  |  100  |  20  ----------------------------<  106<H>  4.1   |  23  |  0.86    Ca    9.8      24 Jan 2020 05:34  Phos  4.0     01-24  Mg     2.4     01-24    TPro  7.2  /  Alb  4.5  /  TBili  0.5  /  DBili  x   /  AST  26  /  ALT  30  /  AlkPhos  50  01-22    proBNP:   Lipid Profile:   HgA1c:   TSH:     Consultant(s) Notes Reviewed:  [x ] YES  [ ] NO    Care Discussed with Consultants/Other Providers [ x] YES  [ ] NO    Imaging Personally Reviewed independently:  [x] YES  [ ] NO    All labs, radiologic studies, vitals, orders and medications list reviewed. Patient is seen and examined at bedside. Case discussed with medical team.

## 2020-01-24 NOTE — DISCHARGE NOTE PROVIDER - NSDCMRMEDTOKEN_GEN_ALL_CORE_FT
aspirin 81 mg oral delayed release tablet: 1 tab(s) orally once a day  atorvastatin 80 mg oral tablet: 1 tab(s) orally once a day (at bedtime)  Brilinta (ticagrelor) 90 mg oral tablet: 1 tab(s) orally 2 times a day MDD:Two tabs  lisinopril 20 mg oral tablet: 1 tab(s) orally once a day  metoprolol succinate 25 mg oral tablet, extended release: 1 tab(s) orally once a day

## 2020-01-24 NOTE — DISCHARGE NOTE PROVIDER - HOSPITAL COURSE
60 y/o m w/ PMH of HTN p/w chest pain found to have positive CT coronary test result, planned for cardiac cath tomorrow        Chest pain with moderate risk for cardiac etiology, s/p a positive CT coronary study. Pt underwent Cardiac cath, PCIx1. DCP with DAPT, statin and cards f/u.             Pt stable dc home with outpatient follow up with PMD and Cardiologist. 58 y/o m w/ PMH of HTN p/w chest pain found to have positive CT coronary test result. Seen and evaluated by Cards. s/p cath with  LM normal LAD mild luminal D1 80% ostial medically managed , OM1 80% s/p PCI , RCA luminal cont asa brilinta. Patient remains stable for DC home with close follow up with PCP and Cards as per Dr. Abad

## 2020-01-25 VITALS — OXYGEN SATURATION: 96 % | HEART RATE: 62 BPM

## 2020-01-25 LAB
ANION GAP SERPL CALC-SCNC: 11 MMOL/L — SIGNIFICANT CHANGE UP (ref 5–17)
BUN SERPL-MCNC: 24 MG/DL — HIGH (ref 7–23)
CALCIUM SERPL-MCNC: 9.5 MG/DL — SIGNIFICANT CHANGE UP (ref 8.4–10.5)
CHLORIDE SERPL-SCNC: 104 MMOL/L — SIGNIFICANT CHANGE UP (ref 96–108)
CO2 SERPL-SCNC: 23 MMOL/L — SIGNIFICANT CHANGE UP (ref 22–31)
CREAT SERPL-MCNC: 0.83 MG/DL — SIGNIFICANT CHANGE UP (ref 0.5–1.3)
GLUCOSE SERPL-MCNC: 110 MG/DL — HIGH (ref 70–99)
POTASSIUM SERPL-MCNC: 3.9 MMOL/L — SIGNIFICANT CHANGE UP (ref 3.5–5.3)
POTASSIUM SERPL-SCNC: 3.9 MMOL/L — SIGNIFICANT CHANGE UP (ref 3.5–5.3)
SODIUM SERPL-SCNC: 138 MMOL/L — SIGNIFICANT CHANGE UP (ref 135–145)

## 2020-01-25 PROCEDURE — 93005 ELECTROCARDIOGRAM TRACING: CPT

## 2020-01-25 PROCEDURE — 99152 MOD SED SAME PHYS/QHP 5/>YRS: CPT

## 2020-01-25 PROCEDURE — 80061 LIPID PANEL: CPT

## 2020-01-25 PROCEDURE — 75574 CT ANGIO HRT W/3D IMAGE: CPT

## 2020-01-25 PROCEDURE — 80048 BASIC METABOLIC PNL TOTAL CA: CPT

## 2020-01-25 PROCEDURE — 83036 HEMOGLOBIN GLYCOSYLATED A1C: CPT

## 2020-01-25 PROCEDURE — 85610 PROTHROMBIN TIME: CPT

## 2020-01-25 PROCEDURE — C1894: CPT

## 2020-01-25 PROCEDURE — 80053 COMPREHEN METABOLIC PANEL: CPT

## 2020-01-25 PROCEDURE — 99153 MOD SED SAME PHYS/QHP EA: CPT

## 2020-01-25 PROCEDURE — C1874: CPT

## 2020-01-25 PROCEDURE — C9600: CPT | Mod: LC

## 2020-01-25 PROCEDURE — 94660 CPAP INITIATION&MGMT: CPT

## 2020-01-25 PROCEDURE — C1887: CPT

## 2020-01-25 PROCEDURE — 84100 ASSAY OF PHOSPHORUS: CPT

## 2020-01-25 PROCEDURE — 93454 CORONARY ARTERY ANGIO S&I: CPT | Mod: 59

## 2020-01-25 PROCEDURE — G0378: CPT

## 2020-01-25 PROCEDURE — 83735 ASSAY OF MAGNESIUM: CPT

## 2020-01-25 PROCEDURE — 99238 HOSP IP/OBS DSCHRG MGMT 30/<: CPT

## 2020-01-25 PROCEDURE — 84484 ASSAY OF TROPONIN QUANT: CPT

## 2020-01-25 PROCEDURE — 86803 HEPATITIS C AB TEST: CPT

## 2020-01-25 PROCEDURE — 85027 COMPLETE CBC AUTOMATED: CPT

## 2020-01-25 PROCEDURE — 99285 EMERGENCY DEPT VISIT HI MDM: CPT | Mod: 25

## 2020-01-25 PROCEDURE — C1769: CPT

## 2020-01-25 PROCEDURE — 85730 THROMBOPLASTIN TIME PARTIAL: CPT

## 2020-01-25 PROCEDURE — 93010 ELECTROCARDIOGRAM REPORT: CPT

## 2020-01-25 PROCEDURE — 71046 X-RAY EXAM CHEST 2 VIEWS: CPT

## 2020-01-25 RX ADMIN — TICAGRELOR 90 MILLIGRAM(S): 90 TABLET ORAL at 05:27

## 2020-01-25 RX ADMIN — HEPARIN SODIUM 5000 UNIT(S): 5000 INJECTION INTRAVENOUS; SUBCUTANEOUS at 05:27

## 2020-01-25 RX ADMIN — LISINOPRIL 20 MILLIGRAM(S): 2.5 TABLET ORAL at 05:27

## 2020-01-25 RX ADMIN — Medication 25 MILLIGRAM(S): at 05:27

## 2020-01-25 RX ADMIN — TICAGRELOR 90 MILLIGRAM(S): 90 TABLET ORAL at 18:39

## 2020-01-25 RX ADMIN — Medication 81 MILLIGRAM(S): at 18:39

## 2020-01-25 NOTE — PROGRESS NOTE ADULT - SUBJECTIVE AND OBJECTIVE BOX
Edenilson Vee MD  Interventional Cardiology / Endovascular Specialist  [place] Office : [address] [place] [address] [place]. [address]  Tel:   [place] Office : [address] [facility] [place] [address]  Tel: [phone]  Cell : [phone]    HISTORY OF PRESENTING ILLNESS:    Pt is lying in bed comfortable not in distress, no chest pains no SOB no palpitations s/p PCI of OM1  	  MEDICATIONS:  aspirin enteric coated 81 milliGRAM(s) Oral daily  heparin  Injectable 5000 Unit(s) SubCutaneous every 8 hours  lisinopril 20 milliGRAM(s) Oral daily  metoprolol succinate ER 25 milliGRAM(s) Oral daily  ticagrelor 90 milliGRAM(s) Oral every 12 hours            atorvastatin 80 milliGRAM(s) Oral at bedtime    influenza   Vaccine 0.5 milliLiter(s) IntraMuscular once      PAST MEDICAL/SURGICAL HISTORY  PAST MEDICAL & SURGICAL HISTORY:  HTN (hypertension)  No significant past surgical history      SOCIAL HISTORY: Substance Use (street drugs): ( x ) never used  (  ) other:    FAMILY HISTORY:  FH: myocardial infarction      REVIEW OF SYSTEMS:  CONSTITUTIONAL: No fever, weight loss, or fatigue  EYES: No eye pain, visual disturbances, or discharge  ENMT:  No difficulty hearing, tinnitus, vertigo; No sinus or throat pain  BREASTS: No pain, masses, or nipple discharge  GASTROINTESTINAL: No abdominal or epigastric pain. No nausea, vomiting, or hematemesis; No diarrhea or constipation. No melena or hematochezia.  GENITOURINARY: No dysuria, frequency, hematuria, or incontinence  NEUROLOGICAL: No headaches, memory loss, loss of strength, numbness, or tremors  ENDOCRINE: No heat or cold intolerance; No hair loss  MUSCULOSKELETAL: No joint pain or swelling; No muscle, back, or extremity pain  PSYCHIATRIC: No depression, anxiety, mood swings, or difficulty sleeping  HEME/LYMPH: No easy bruising, or bleeding gums  All others negative    PHYSICAL EXAM:  T(C): 36.7 (01-25-20 @ 12:39), Max: 36.7 (01-25-20 @ 12:39)  HR: 62 (01-25-20 @ 18:37) (56 - 68)  BP: 112/73 (01-25-20 @ 12:39) (112/73 - 119/73)  RR: 16 (01-25-20 @ 05:21) (16 - 16)  SpO2: 96% (01-25-20 @ 18:37) (94% - 98%)  Wt(kg): --  I&O's Summary    24 Jan 2020 07:01  -  25 Jan 2020 07:00  --------------------------------------------------------  IN: 300 mL / OUT: 300 mL / NET: 0 mL          GENERAL: NAD, well-groomed, well-developed  EYES: EOMI, PERRLA, conjunctiva and sclera clear  ENMT: No tonsillar erythema, exudates, or enlargement; Moist mucous membranes, Good dentition, No lesions  Cardiovascular: Normal S1 S2, No JVD, No murmurs, No edema  Respiratory: Lungs clear to auscultation	  Gastrointestinal:  Soft, Non-tender, + BS	  Extremities: Normal range of motion, No clubbing, cyanosis or edema  LYMPH: No lymphadenopathy noted  NERVOUS SYSTEM:  Alert & Oriented X3, Good concentration; Motor Strength 5/5 B/L upper and lower extremities; DTRs 2+ intact and symmetric                                    15.1   8.67  )-----------( 208      ( 24 Jan 2020 08:25 )             45.0     01-25    138  |  104  |  24<H>  ----------------------------<  110<H>  3.9   |  23  |  0.83    Ca    9.5      25 Jan 2020 05:52  Phos  4.0     01-24  Mg     2.4     01-24      proBNP:   Lipid Profile:   HgA1c:   TSH:     Consultant(s) Notes Reviewed:  [x ] YES  [ ] NO    Care Discussed with Consultants/Other Providers [ x] YES  [ ] NO    Imaging Personally Reviewed independently:  [x] YES  [ ] NO    All labs, radiologic studies, vitals, orders and medications list reviewed. Patient is seen and examined at bedside. Case discussed with medical team.

## 2020-01-25 NOTE — CHART NOTE - NSCHARTNOTEFT_GEN_A_CORE
Radial artery cath site follow up note       Patient is s/p cardiac cath via RRA ( X2 SAMAN to OM1) on 1/24/2020 tolerated procedure well, stable overnight night ( see vitals below) remains asymptomatic this morning   Pulses in the (right) upper extremity are (palpable + 2), + capillary refill, no bleeding or hematoma noted, site soft, denies right  arm or wrist pain, denies numbness or tingling of Right upper extr,   Complications: None  tele reviewed with SR no ectopy reported  ECG with no significant findings     -teaching/education reinforced with patient about medication compliance specifically with Aspirin/Brilinta/Statin, weight loss, low chol/ low salt diet and exercise, smoking cessation encouraged, and site precautions ( No heavy lifting for 2 weeks, no strenuous activity  such as pushing/ pulling, no driving for x 2 days,  may shower 24 hours following procedure but no bathing or swimming for x1  week, no strenuous sex for x 1 week & follow up with your cardiologist in 1-2 week )  -patient aware that if any complications arise ( such as: chest pain, bleeding or hematoma from RRA, ect.. ) he needs to seek immediate medical attention by either calling 911 or going to nearest ER  -patient verbalizes understanding and gives positive feedback     ICU Vital Signs Last 24 Hrs  T(C): 36.6 (25 Jan 2020 05:21), Max: 36.7 (24 Jan 2020 20:34)  T(F): 97.8 (25 Jan 2020 05:21), Max: 98.1 (24 Jan 2020 20:34)  HR: 68 (25 Jan 2020 05:56) (56 - 68)  BP: 119/73 (25 Jan 2020 05:21) (119/73 - 138/87)  BP(mean): --  ABP: --  ABP(mean): --  RR: 16 (25 Jan 2020 05:21) (16 - 18)  SpO2: 98% (25 Jan 2020 05:56) (94% - 98%)                          15.1   8.67  )-----------( 208      ( 24 Jan 2020 08:25 )             45.0     01-25    138  |  104  |  24<H>  ----------------------------<  110<H>  3.9   |  23  |  0.83    Ca    9.5      25 Jan 2020 05:52  Phos  4.0     01-24  Mg     2.4     01-24      < from: Cardiac Cath Lab - Adult (01.24.20 @ 08:50) >    VENTRICLES: There were no left ventricular global or regional wall motion  abnormalities.  CORONARY VESSELS: The coronary circulation is right dominant.  LM:   --  LM: Normal.  LAD:   --  LAD: Angiography showed mild atherosclerosis with no flow  limiting lesions.  --  D1: There was a 80 % stenosis. ostial disease will treat medically for  now  CX:   --  OM1: There was a 80 % stenosis.  RCA:   --  RCA: Angiography showed mild atherosclerosis with no flow  limiting lesions.  COMPLICATIONS: There were no complications.  INTERVENTIONAL RECOMMENDATIONS: cont asa and brilinta for 1 year  Prepared and signed by  Edenilson Vee M.D.    < end of copied text >      Kristine Green NP  invasive cardiology

## 2020-01-25 NOTE — PROGRESS NOTE ADULT - REASON FOR ADMISSION
chest pain, found to have + CT Coronary

## 2020-01-25 NOTE — DISCHARGE NOTE NURSING/CASE MANAGEMENT/SOCIAL WORK - NSDCFUADDAPPT_GEN_ALL_CORE_FT
Please follow up with PMD within 1 week of discharge.   Please follow up with Cardiologist, Dr. Vee within 1 week of discharge for the management. Dr. Vee will follow up with your medication (Brilinta) prior authorization status as well. Please continue to take meds as directed

## 2020-01-25 NOTE — DISCHARGE NOTE NURSING/CASE MANAGEMENT/SOCIAL WORK - PATIENT PORTAL LINK FT
You can access the FollowMyHealth Patient Portal offered by [facility] by registering at the following website: [email or web address]. By joining [facility] FollowMyHealth portal, you will also be able to view your health information using other applications (apps) compatible with our system.

## 2021-04-02 NOTE — ED ADULT NURSE NOTE - NS ED NOTE  TALK SOMEONE YN
Can you please call and let mom know I sent Hydrocortisone ointment to the pharmacy for Scott. He can use this twice a day as needed. Also make sure they are using a good moisturizer every day, such as Baby Dove, Aveeno, Aquaphor, etc. If no improvement, let us know.  Thank you! No

## 2021-07-06 NOTE — ED ADULT TRIAGE NOTE - ESI TRIAGE ACUITY LEVEL, MLM
Patient contacted and advised that she cannot have any solid food for breakfast,lunch or dinner one day before her colonoscopy per prep instructions. Patient voiced understanding. 2
